# Patient Record
(demographics unavailable — no encounter records)

---

## 2025-03-01 NOTE — HISTORY OF PRESENT ILLNESS
[FreeTextEntry1] : Ms. RAN HAYS returns to the office for follow-up and her prior history and physical have been reviewed and she reports no change since last visit.  she has been seeing us for lumbar radiculopathy which has been stable, but today she has new complaints.   Patient presents with complaints of headache and tingling sensations in the head region that started yesterday. The tingling symptoms persist, alternating between different sides of the head. The patient reports experiencing these symptoms particularly when lying down, with continued symptoms upon waking this morning. Patient also endorses occasional neck pain. There is an associated history of dizziness/vertigo. Patient has a known history of back problems. No reported trauma. Patient previously had acupuncture which provided temporary relief of pain symptoms.

## 2025-03-01 NOTE — ASSESSMENT
[FreeTextEntry1] : 1. Cervicogenic Headache with Paresthesia - Symptoms likely originating from cervical region based on distribution -xray of cervical spine - MRI of cervical spine to rule out structural pathology - Referral to physical therapy for cervical treatment  2. Vertigo-most likely peripheral vestibular dysfunction vs cervicogenic vertigo - MRI of brain without bismark to rule cva, cerebellar lesions. - Referral for vestibular therapy  3. Chronic lumbar radiculopathy-stable

## 2025-04-10 NOTE — ASSESSMENT
[FreeTextEntry1] : 1. Cervical Spondylosis with Radiculopathy: - Showing improvement with conservative management - Continue physical therapy with new prescription - Goal to complete 12-16 sessions total for optimal benefit - Transition to home exercise program once appropriate  2. Cervicogenic Headache and Dizziness: - Improving with cervical therapy - Symptoms consistent with cervical origin given improvement with treatment

## 2025-04-10 NOTE — HISTORY OF PRESENT ILLNESS
[FreeTextEntry1] : Patient returns for follow-up of cervical pain with associated headache and dizziness. Reports improvement in symptoms with physical therapy, which they have been attending 1-2 times per week for approximately 8-10 sessions. Headache and dizziness have shown improvement with neck therapy, suggesting cervicogenic origin. Patient also mentions recent onset of hip pain with radiation down the leg, consistent with radicular symptoms. Has not tried previously prescribed gabapentin. Currently taking medications for cholesterol and diabetes management.

## 2025-06-12 NOTE — ASSESSMENT
[FreeTextEntry1] : Vertigo: The patient's vertigo is likely multifactorial, involving both cervical and vestibular components. The neck therapy has provided some improvement, but symptoms persist, suggesting a possible vestibular origin. - Plan: Refer the patient for vestibular therapy

## 2025-06-12 NOTE — HISTORY OF PRESENT ILLNESS
[FreeTextEntry1] : The patient reports ongoing episodes of vertigo since the last visit two months ago. The symptoms have not completely resolved but have shown some improvement with neck therapy. The vertigo is described as intermittent, occurring primarily when lying down on one side, particularly at night. The patient has previously undergone therapy for the neck, which provided some relief, but the symptoms persist. The patient recalls a past experience with the Epley maneuver, which was performed once in a hospital setting and provided temporary relief. The patient expresses uncertainty about the effectiveness of the neck therapy and is interested in exploring further treatment options.